# Patient Record
Sex: FEMALE | Race: WHITE | URBAN - METROPOLITAN AREA
[De-identification: names, ages, dates, MRNs, and addresses within clinical notes are randomized per-mention and may not be internally consistent; named-entity substitution may affect disease eponyms.]

---

## 2019-01-13 ENCOUNTER — HOSPITAL ENCOUNTER (EMERGENCY)
Age: 59
Discharge: ANOTHER HOSPITAL | End: 2019-01-13
Attending: EMERGENCY MEDICINE
Payer: COMMERCIAL

## 2019-01-13 ENCOUNTER — APPOINTMENT (OUTPATIENT)
Dept: CT IMAGING | Age: 59
End: 2019-01-13
Payer: COMMERCIAL

## 2019-01-13 VITALS
RESPIRATION RATE: 16 BRPM | TEMPERATURE: 97.4 F | HEART RATE: 76 BPM | HEIGHT: 66 IN | SYSTOLIC BLOOD PRESSURE: 109 MMHG | WEIGHT: 165 LBS | OXYGEN SATURATION: 98 % | BODY MASS INDEX: 26.52 KG/M2 | DIASTOLIC BLOOD PRESSURE: 70 MMHG

## 2019-01-13 DIAGNOSIS — K56.609 SBO (SMALL BOWEL OBSTRUCTION) (HCC): Primary | ICD-10-CM

## 2019-01-13 LAB
ALBUMIN SERPL-MCNC: 4.5 G/DL (ref 3.5–5.2)
ALP BLD-CCNC: 67 U/L (ref 35–104)
ALT SERPL-CCNC: 15 U/L (ref 0–32)
ANION GAP SERPL CALCULATED.3IONS-SCNC: 13 MMOL/L (ref 7–16)
AST SERPL-CCNC: 16 U/L (ref 0–31)
BASOPHILS ABSOLUTE: 0.05 E9/L (ref 0–0.2)
BASOPHILS RELATIVE PERCENT: 0.5 % (ref 0–2)
BILIRUB SERPL-MCNC: 0.3 MG/DL (ref 0–1.2)
BUN BLDV-MCNC: 15 MG/DL (ref 6–20)
CALCIUM SERPL-MCNC: 9.1 MG/DL (ref 8.6–10.2)
CHLORIDE BLD-SCNC: 105 MMOL/L (ref 98–107)
CO2: 22 MMOL/L (ref 22–29)
CREAT SERPL-MCNC: 0.9 MG/DL (ref 0.5–1)
EOSINOPHILS ABSOLUTE: 0.08 E9/L (ref 0.05–0.5)
EOSINOPHILS RELATIVE PERCENT: 0.8 % (ref 0–6)
GFR AFRICAN AMERICAN: >60
GFR NON-AFRICAN AMERICAN: >60 ML/MIN/1.73
GLUCOSE BLD-MCNC: 125 MG/DL (ref 74–99)
HCT VFR BLD CALC: 43.2 % (ref 34–48)
HEMOGLOBIN: 13.6 G/DL (ref 11.5–15.5)
IMMATURE GRANULOCYTES #: 0.03 E9/L
IMMATURE GRANULOCYTES %: 0.3 % (ref 0–5)
LACTIC ACID: 2.2 MMOL/L (ref 0.5–2.2)
LIPASE: 24 U/L (ref 13–60)
LYMPHOCYTES ABSOLUTE: 1.03 E9/L (ref 1.5–4)
LYMPHOCYTES RELATIVE PERCENT: 9.7 % (ref 20–42)
MAGNESIUM: 2 MG/DL (ref 1.6–2.6)
MCH RBC QN AUTO: 28.8 PG (ref 26–35)
MCHC RBC AUTO-ENTMCNC: 31.5 % (ref 32–34.5)
MCV RBC AUTO: 91.5 FL (ref 80–99.9)
MONOCYTES ABSOLUTE: 0.4 E9/L (ref 0.1–0.95)
MONOCYTES RELATIVE PERCENT: 3.8 % (ref 2–12)
NEUTROPHILS ABSOLUTE: 9.06 E9/L (ref 1.8–7.3)
NEUTROPHILS RELATIVE PERCENT: 84.9 % (ref 43–80)
PDW BLD-RTO: 12.5 FL (ref 11.5–15)
PLATELET # BLD: 369 E9/L (ref 130–450)
PMV BLD AUTO: 9.5 FL (ref 7–12)
POTASSIUM SERPL-SCNC: 4.8 MMOL/L (ref 3.5–5)
RBC # BLD: 4.72 E12/L (ref 3.5–5.5)
SODIUM BLD-SCNC: 140 MMOL/L (ref 132–146)
TOTAL PROTEIN: 8.3 G/DL (ref 6.4–8.3)
WBC # BLD: 10.7 E9/L (ref 4.5–11.5)

## 2019-01-13 PROCEDURE — 96374 THER/PROPH/DIAG INJ IV PUSH: CPT

## 2019-01-13 PROCEDURE — 74176 CT ABD & PELVIS W/O CONTRAST: CPT

## 2019-01-13 PROCEDURE — 83605 ASSAY OF LACTIC ACID: CPT

## 2019-01-13 PROCEDURE — 99284 EMERGENCY DEPT VISIT MOD MDM: CPT

## 2019-01-13 PROCEDURE — 80053 COMPREHEN METABOLIC PANEL: CPT

## 2019-01-13 PROCEDURE — 96375 TX/PRO/DX INJ NEW DRUG ADDON: CPT

## 2019-01-13 PROCEDURE — 83690 ASSAY OF LIPASE: CPT

## 2019-01-13 PROCEDURE — 6360000002 HC RX W HCPCS: Performed by: EMERGENCY MEDICINE

## 2019-01-13 PROCEDURE — 2580000003 HC RX 258: Performed by: EMERGENCY MEDICINE

## 2019-01-13 PROCEDURE — 96376 TX/PRO/DX INJ SAME DRUG ADON: CPT

## 2019-01-13 PROCEDURE — 36415 COLL VENOUS BLD VENIPUNCTURE: CPT

## 2019-01-13 PROCEDURE — 83735 ASSAY OF MAGNESIUM: CPT

## 2019-01-13 PROCEDURE — 85025 COMPLETE CBC W/AUTO DIFF WBC: CPT

## 2019-01-13 RX ORDER — SODIUM CHLORIDE 9 MG/ML
INJECTION, SOLUTION INTRAVENOUS CONTINUOUS
Status: DISCONTINUED | OUTPATIENT
Start: 2019-01-13 | End: 2019-01-13 | Stop reason: HOSPADM

## 2019-01-13 RX ORDER — M-VIT,TX,IRON,MINS/CALC/FOLIC 27MG-0.4MG
1 TABLET ORAL DAILY
COMMUNITY

## 2019-01-13 RX ORDER — LEVOTHYROXINE SODIUM 0.07 MG/1
0.75 TABLET ORAL DAILY
COMMUNITY

## 2019-01-13 RX ORDER — DIPHENHYDRAMINE HYDROCHLORIDE 50 MG/ML
12.5 INJECTION INTRAMUSCULAR; INTRAVENOUS ONCE
Status: COMPLETED | OUTPATIENT
Start: 2019-01-13 | End: 2019-01-13

## 2019-01-13 RX ORDER — MORPHINE SULFATE 4 MG/ML
4 INJECTION, SOLUTION INTRAMUSCULAR; INTRAVENOUS ONCE
Status: COMPLETED | OUTPATIENT
Start: 2019-01-13 | End: 2019-01-13

## 2019-01-13 RX ORDER — METOCLOPRAMIDE HYDROCHLORIDE 5 MG/ML
5 INJECTION INTRAMUSCULAR; INTRAVENOUS ONCE
Status: COMPLETED | OUTPATIENT
Start: 2019-01-13 | End: 2019-01-13

## 2019-01-13 RX ORDER — 0.9 % SODIUM CHLORIDE 0.9 %
500 INTRAVENOUS SOLUTION INTRAVENOUS ONCE
Status: COMPLETED | OUTPATIENT
Start: 2019-01-13 | End: 2019-01-13

## 2019-01-13 RX ADMIN — DIPHENHYDRAMINE HYDROCHLORIDE 12.5 MG: 50 INJECTION, SOLUTION INTRAMUSCULAR; INTRAVENOUS at 08:08

## 2019-01-13 RX ADMIN — METOCLOPRAMIDE 5 MG: 5 INJECTION, SOLUTION INTRAMUSCULAR; INTRAVENOUS at 08:08

## 2019-01-13 RX ADMIN — MORPHINE SULFATE 4 MG: 4 INJECTION, SOLUTION INTRAMUSCULAR; INTRAVENOUS at 08:08

## 2019-01-13 RX ADMIN — SODIUM CHLORIDE: 9 INJECTION, SOLUTION INTRAVENOUS at 11:07

## 2019-01-13 RX ADMIN — METOCLOPRAMIDE 5 MG: 5 INJECTION, SOLUTION INTRAMUSCULAR; INTRAVENOUS at 14:12

## 2019-01-13 RX ADMIN — SODIUM CHLORIDE 500 ML: 9 INJECTION, SOLUTION INTRAVENOUS at 08:08

## 2019-01-13 ASSESSMENT — PAIN DESCRIPTION - PAIN TYPE: TYPE: ACUTE PAIN

## 2019-01-13 ASSESSMENT — PAIN DESCRIPTION - LOCATION: LOCATION: ABDOMEN

## 2019-01-13 ASSESSMENT — PAIN DESCRIPTION - FREQUENCY: FREQUENCY: INTERMITTENT

## 2019-01-13 ASSESSMENT — PAIN DESCRIPTION - DESCRIPTORS: DESCRIPTORS: ACHING

## 2019-01-13 ASSESSMENT — PAIN SCALES - GENERAL
PAINLEVEL_OUTOF10: 10
PAINLEVEL_OUTOF10: 8

## 2019-01-13 NOTE — ED NOTES
Spoke to [facility], awaiting information re: transportation.      Melissa Swanson, RN  01/13/19 9752

## 2019-01-13 NOTE — ED NOTES
Irrigated ostomy with sarah catheter and 500cc sterile saline. Tolerated well and c/o mild cramping while saline was infusing. Some saline gushed out, some retained. Stoma bag placed back on flange and will continue to observe. Dr. Taylor Sarah updated on procedure.       Tarik Warren, RADHA  01/13/19 1257

## 2019-01-13 NOTE — ED PROVIDER NOTES
Department of Emergency Medicine   ED  Provider Note  Admit Date/RoomTime: 1/13/2019  7:34 AM  ED Room: OSIRIS/OSIRIS          History of Present Illness:  1/13/19, Time: 7:50 AM         Brady Perez is a 66 y.o. female presenting to the ED for abdominal pain, beginning a few hours ago. The complaint has been persistent, moderate in severity, and worsened by nothing. Pt states that she has pain around her ileostomy. States that she ate sauerkraut 1 day ago. States that after she started to have  intermittent pain to the area with no normal output. Reports that she thought it was clogged. Does report liquid output in collection bag. Pt did not take any medications for her symptoms. Also has nausea and burping. Reports that she had a total colectomy due to crohn's/ulcerative colitis; patient is unsure. States that she follows up in [place]. Denies bloody output. Denies chest pain, shortness of breath, fever, chills, emesis, diarrhea, urinary symptoms, and further complaints at this time. Review of Systems:   Pertinent positives and negatives are stated within HPI, all other systems reviewed and are negative.      --------------------------------------------- PAST HISTORY ---------------------------------------------  Past Medical History:  has no past medical history on file. Past Surgical History:  has a past surgical history that includes joint replacement and Colon surgery. Social History:  reports that she has never smoked. She has never used smokeless tobacco. She reports that she does not drink alcohol or use drugs. Family History: family history is not on file. The patients home medications have been reviewed. Allergies: Flagyl [metronidazole]; Iodine; Iv dye [iodides];  Other; Statins; and Sulfasalazine      ---------------------------------------------------PHYSICAL EXAM--------------------------------------    Constitutional/General: Alert and oriented x3  Head: Normocephalic and atraumatic  Eyes: PERRL, EOMI  Mouth: Oropharynx clear, handling secretions  Neck: Supple, full ROM  Respiratory: Lungs clear to auscultation bilaterally, no wheezes, rales, or rhonchi. Not in respiratory distress  Cardiovascular:  Regular rate. Regular rhythm. No murmurs, gallops, or rubs. 2+ distal pulses  Chest: No chest wall tenderness  GI:  Abdomen Soft, RLQ tenderness with ileostomy bag-liquid stool, no solid stool, Non distended. Hyperactive BS. No rebound, guarding, or rigidity. No pulsatile masses. Musculoskeletal: Moves all extremities x 4. Warm and well perfused, no edema. Integument: skin warm and dry. No rashes. Neurologic: GCS 15, no focal deficits, symmetric strength 5/5 in the upper and lower extremities bilaterally  Psychiatric: Normal Affect      -------------------------------------------------- RESULTS -------------------------------------------------  I have personally reviewed all laboratory and imaging results for this patient. Results are listed below.      LABS:  Results for orders placed or performed during the hospital encounter of 01/13/19   CBC Auto Differential   Result Value Ref Range    WBC 10.7 4.5 - 11.5 E9/L    RBC 4.72 3.50 - 5.50 E12/L    Hemoglobin 13.6 11.5 - 15.5 g/dL    Hematocrit 43.2 34.0 - 48.0 %    MCV 91.5 80.0 - 99.9 fL    MCH 28.8 26.0 - 35.0 pg    MCHC 31.5 (L) 32.0 - 34.5 %    RDW 12.5 11.5 - 15.0 fL    Platelets 678 958 - 245 E9/L    MPV 9.5 7.0 - 12.0 fL    Neutrophils % 84.9 (H) 43.0 - 80.0 %    Immature Granulocytes % 0.3 0.0 - 5.0 %    Lymphocytes % 9.7 (L) 20.0 - 42.0 %    Monocytes % 3.8 2.0 - 12.0 %    Eosinophils % 0.8 0.0 - 6.0 %    Basophils % 0.5 0.0 - 2.0 %    Neutrophils # 9.06 (H) 1.80 - 7.30 E9/L    Immature Granulocytes # 0.03 E9/L    Lymphocytes # 1.03 (L) 1.50 - 4.00 E9/L    Monocytes # 0.40 0.10 - 0.95 E9/L    Eosinophils # 0.08 0.05 - 0.50 E9/L    Basophils # 0.05 0.00 - 0.20 E9/L   Comprehensive Metabolic Panel   Result Value Ref Range Sodium 140 132 - 146 mmol/L    Potassium 4.8 3.5 - 5.0 mmol/L    Chloride 105 98 - 107 mmol/L    CO2 22 22 - 29 mmol/L    Anion Gap 13 7 - 16 mmol/L    Glucose 125 (H) 74 - 99 mg/dL    BUN 15 6 - 20 mg/dL    CREATININE 0.9 0.5 - 1.0 mg/dL    GFR Non-African American >60 >=60 mL/min/1.73    GFR African American >60     Calcium 9.1 8.6 - 10.2 mg/dL    Total Protein 8.3 6.4 - 8.3 g/dL    Alb 4.5 3.5 - 5.2 g/dL    Total Bilirubin 0.3 0.0 - 1.2 mg/dL    Alkaline Phosphatase 67 35 - 104 U/L    ALT 15 0 - 32 U/L    AST 16 0 - 31 U/L   Lactic Acid, Plasma   Result Value Ref Range    Lactic Acid 2.2 0.5 - 2.2 mmol/L   Magnesium   Result Value Ref Range    Magnesium 2.0 1.6 - 2.6 mg/dL   Lipase   Result Value Ref Range    Lipase 24 13 - 60 U/L       RADIOLOGY:  Interpreted by Radiologist.  CT ABDOMEN PELVIS WO CONTRAST Additional Contrast? Oral   Final Result   There are findings to suggest a small bowel obstruction. There are   multiple distal dilated small bowel loops present. There appears to be   a peristomal hernia. Distended bladder   Mild bilateral hydronephrosis         ALERT:  THIS IS AN ABNORMAL REPORT                              ------------------------- NURSING NOTES AND VITALS REVIEWED ---------------------------   The nursing notes within the ED encounter and vital signs as below have been reviewed by myself. /70   Pulse 76   Temp 97.4 °F (36.3 °C)   Resp 16   Ht 5' 6\" (1.676 m)   Wt 165 lb (74.8 kg)   SpO2 98%   BMI 26.63 kg/m²   Oxygen Saturation Interpretation: Normal    The patients available past medical records and past encounters were reviewed.         ------------------------------ ED COURSE/MEDICAL DECISION MAKING----------------------  Medications   metoclopramide (REGLAN) injection 5 mg (5 mg Intravenous Given 1/13/19 0808)   diphenhydrAMINE (BENADRYL) injection 12.5 mg (12.5 mg Intravenous Given 1/13/19 0808)   morphine sulfate (PF) injection 4 mg (4 mg Intravenous Given 1/13/19 1796)   0.9 % sodium chloride bolus (0 mLs Intravenous Stopped 1/13/19 1328)   metoclopramide (REGLAN) injection 5 mg (5 mg Intravenous Given 1/13/19 1412)       Medical Decision Making:    Patient presents to the ED with abdominal pain without normal output into ileostomy bag after eating sauerkraut. Has had liquid output. Had CT abdomen, lab work, IV fluid with medication, and monitoring ordered. Surgical resident came to the ED to evaluate the patient. Will be transferred to [facility]. Spoke with surgery at [facility], they will accept the patient is a transfer request we attempt to irrigate the ileostomy. They recommend we take a Guerrero catheter, leave the bleeding down, placed the ileostomy and infuse a 500 mL of saline. This was attempted twice, patient did have some return of the sauerkraut, but the output of the ostomy again stopped. She will require transfer for surgical consultation    This patient's ED course included: a personal history and physicial examination, re-evaluation prior to disposition, IV medications and cardiac monitoring    This patient has remained hemodynamically stable and been closely monitored during their ED course. Re-Evaluations:              Discussed results. Upon re-examination, patients symptoms are improving. Consultations:  Time: 10:39 AM.   Spoke with surgical resident, Dr. Prerna De Guzman (Surgery). Discussed case. They will come to the ED to evaluate this patient. Reported that pt would like to be transferred to [facility] for treatment. Spoke with Surgery @ [facility]. They will accept the patient as a transfer, they request we attempt to irrigate the ileostomy prior to transfer as this will often resolve the obstruction. Counseling: The emergency provider has spoken with the patient and discussed todays results, in addition to providing specific details for the plan of care and counseling regarding the diagnosis and prognosis.   Questions are answered at this time and they are agreeable with the plan.       --------------------------------- IMPRESSION AND DISPOSITION ---------------------------------    IMPRESSION  1. [facility]        DISPOSITION  Disposition: Transfer to [facility] to Surgery  Patient condition is stable    SCRIBE ATTESTATION  1/13/19, 7:50 AM.    This note is prepared by Jonatan Silveira acting as Scribe for Boom Wilson DO. Boom Wilson DO:  The scribe's documentation has been prepared under my direction and personally reviewed by me in its entirety. I confirm that the note above accurately reflects all work, treatment, procedures, and medical decision making performed by me.              Boom Wilson DO  01/14/19 7744

## 2019-01-13 NOTE — ED NOTES
Spoke with [facility], given updated vitals and information on patient. Awaiting call back with room assignment.      Loida Davis RN  01/13/19 3875

## 2019-01-13 NOTE — CONSULTS
[Metronidazole]     Iodine     Iv Dye [Iodides]     Other      apricots    Statins     Sulfasalazine        History reviewed. No pertinent family history. Social History   Substance Use Topics    Smoking status: Never Smoker    Smokeless tobacco: Never Used    Alcohol use No         Review of Systems   General ROS: negative for - chills, fatigue or fever  Hematological and Lymphatic ROS: negative for - fatigue, jaundice or weight loss  Respiratory ROS: no cough, shortness of breath, or wheezing  Cardiovascular ROS: no chest pain or dyspnea on exertion  Gastrointestinal ROS: positive for - abdominal pain, change in stools, and nausea/vomiting  negative for - blood in stools, diarrhea, gas/bloating, heartburn, hematemesis or melena  Genito-Urinary ROS: no dysuria, trouble voiding, or hematuria  Musculoskeletal ROS: negative      PHYSICAL EXAM:    Vitals:    01/13/19 0726   BP: 119/70   Pulse: 91   Resp: 18   Temp: 97.4 °F (36.3 °C)   SpO2: 97%       General Appearance:  awake, alert, oriented, in no acute distress  Skin:  Skin color, texture, turgor normal. No rashes or lesions. Head/face:  NCAT  Eyes:  No gross abnormalities. Lungs:  No increased work of breathing   Heart:  RR  Abdomen:  Soft, mild ttp left abdomen, mildly distended, ileostomy, pink with some dark fluid in ostomy bag  Extremities: Extremities warm to touch, pink, with no edema. LABS:  CBC  Recent Labs      01/13/19   0815   WBC  10.7   HGB  13.6   HCT  43.2   PLT  369     BMP  Recent Labs      01/13/19   0815   NA  140   K  4.8   CL  105   CO2  22   BUN  15   CREATININE  0.9   CALCIUM  9.1     Liver Function  Recent Labs      01/13/19   0815   LIPASE  24   BILITOT  0.3   AST  16   ALT  15   ALKPHOS  67   PROT  8.3   LABALBU  4.5     No results for input(s): LACTATE in the last 72 hours. No results for input(s): INR, PTT in the last 72 hours.     Invalid input(s): PT    RADIOLOGY  Ct Abdomen Pelvis Wo Contrast Additional Contrast? Oral    Result Date: 2019  Patient MRN:  [id number] : 1960 Age: 66 years Gender: Female Order Date:  2019 8:00 AM EXAM: CT ABDOMEN PELVIS WO CONTRAST NUMBER OF IMAGES \ views:  300 INDICATION:  Concern for sbo COMPARISON: None Technique: Low-dose CT  acquisition technique included one of following options; 1 . Automated exposure control, 2. Adjustment of MA and or KV according to patient's size or 3. Use of iterative reconstruction. Multiple computerized tomography sections of the abdomen with sagittal and coronal MPR reconstructions were obtained from the top of the diaphragm to the pelvis. The visualized portions of the abdomen reveal: The lung bases are abnormal. There are bilateral infiltrates at the lung bases, likely related to atelectasis  . Freeman Challenger The liver is unremarkable The spleen is unremarkable. The kidneys are abnormal there is evidence for mild bilateral hydronephrosis The adrenals is  unremarkable. The pancreas is unremarkable. The appendix is not seen The bowel is  abnormal. There are findings to suggest a small bowel obstruction there appears to be a parastomal hernia. . There is an ostomy in the right lower quadrant. The patient appears to be status post colectomy. The duodenum appears to be thickened. The bladder is unremarkable. There are multiple calcifications in the pelvis presumably phleboliths     There are findings to suggest a small bowel obstruction. There are multiple distal dilated small bowel loops present. There appears to be a peristomal hernia.  Distended bladder Mild bilateral hydronephrosis ALERT:  THIS IS AN ABNORMAL REPORT       ASSESSMENT:  66 y.o. female with SBO     PLAN:  Patient requests transfer back to [facility] as that is where her original operation occurred  NPO  IVF  Nausea control for transfer  D/w Dr. Iftikhar Cherry     Electronically signed by Ivy Abdalla MD on 19 at 11:07 AM